# Patient Record
Sex: MALE | Race: OTHER | HISPANIC OR LATINO | URBAN - METROPOLITAN AREA
[De-identification: names, ages, dates, MRNs, and addresses within clinical notes are randomized per-mention and may not be internally consistent; named-entity substitution may affect disease eponyms.]

---

## 2022-05-22 ENCOUNTER — HOSPITAL ENCOUNTER (EMERGENCY)
Facility: HOSPITAL | Age: 22
Discharge: HOME | End: 2022-05-22
Attending: EMERGENCY MEDICINE

## 2022-05-22 VITALS
DIASTOLIC BLOOD PRESSURE: 88 MMHG | TEMPERATURE: 99 F | WEIGHT: 185 LBS | SYSTOLIC BLOOD PRESSURE: 138 MMHG | BODY MASS INDEX: 29.73 KG/M2 | HEIGHT: 66 IN | OXYGEN SATURATION: 100 % | HEART RATE: 89 BPM | RESPIRATION RATE: 20 BRPM

## 2022-05-22 DIAGNOSIS — S92.535A CLOSED NONDISPLACED FRACTURE OF DISTAL PHALANX OF LESSER TOE OF LEFT FOOT, INITIAL ENCOUNTER: Primary | ICD-10-CM

## 2022-05-22 DIAGNOSIS — T14.90XA BLUNT TRAUMA: ICD-10-CM

## 2022-05-22 DIAGNOSIS — S90.812A FOOT ABRASION, LEFT, INITIAL ENCOUNTER: ICD-10-CM

## 2022-05-22 PROCEDURE — 25000003 PHARM REV CODE 250: Performed by: NURSE PRACTITIONER

## 2022-05-22 PROCEDURE — 99284 EMERGENCY DEPT VISIT MOD MDM: CPT | Mod: 25

## 2022-05-22 RX ORDER — IBUPROFEN 400 MG/1
800 TABLET ORAL
Status: COMPLETED | OUTPATIENT
Start: 2022-05-22 | End: 2022-05-22

## 2022-05-22 RX ORDER — IBUPROFEN 800 MG/1
800 TABLET ORAL EVERY 6 HOURS PRN
Qty: 20 TABLET | Refills: 0 | Status: SHIPPED | OUTPATIENT
Start: 2022-05-22

## 2022-05-22 RX ORDER — TRAMADOL HYDROCHLORIDE 50 MG/1
50 TABLET ORAL
Status: COMPLETED | OUTPATIENT
Start: 2022-05-22 | End: 2022-05-22

## 2022-05-22 RX ORDER — TRAMADOL HYDROCHLORIDE 50 MG/1
50 TABLET ORAL EVERY 6 HOURS PRN
Qty: 12 TABLET | Refills: 0 | Status: SHIPPED | OUTPATIENT
Start: 2022-05-22 | End: 2022-05-25

## 2022-05-22 RX ORDER — CEPHALEXIN 500 MG/1
500 CAPSULE ORAL EVERY 8 HOURS
Qty: 21 CAPSULE | Refills: 0 | Status: SHIPPED | OUTPATIENT
Start: 2022-05-22 | End: 2022-05-29

## 2022-05-22 RX ADMIN — TRAMADOL HYDROCHLORIDE 50 MG: 50 TABLET, COATED ORAL at 09:05

## 2022-05-22 RX ADMIN — IBUPROFEN 800 MG: 400 TABLET, FILM COATED ORAL at 09:05

## 2024-11-11 NOTE — ED PROVIDER NOTES
Encounter Date: 5/22/2022    SCRIBE #1 NOTE: Meron REECE am scribing for, and in the presence of, Ale Jordan NP.       History     Chief Complaint   Patient presents with    Foot Pain     Left foot pain and swelling after dropping a 45 lbs weight on foot+ cap refill +1 Dp pulse noted, pinky toe smashed at nail bed, with mild bleeding noted, which is controled at triage      Dean Mckinnon is a 22 y.o. male who  has no past medical history on file.    The patient presents to the ED for evaluation of left foot injury.  Patient reports he dropped a 45-lb weight onto his left foot approximately 1 hour ago. He complains of pain and swelling to dorsum of his left foot near his toes, rated 8/10 in severity, as well as mild bleeding from left fifth toe nail bed. He is also complaining of numbness to his toes. Pain is exacerbated with movement, and patient notes he is unable to bear weight on foot. He denies any tingling sensation. He denies having any other medical problems at this time.       The history is provided by the patient. The history is limited by a language barrier. A  was used (ID: [id number]).     Review of patient's allergies indicates:  No Known Allergies  History reviewed. No pertinent past medical history.  History reviewed. No pertinent surgical history.  History reviewed. No pertinent family history.     Review of Systems   Constitutional: Negative for fever.   HENT: Negative for sore throat.    Respiratory: Negative for shortness of breath.    Cardiovascular: Negative for chest pain.   Gastrointestinal: Negative for nausea.   Genitourinary: Negative for dysuria.   Musculoskeletal: Positive for joint swelling (left foot). Negative for back pain.   Skin: Positive for wound. Negative for rash.   Neurological: Positive for numbness. Negative for weakness.   Hematological: Does not bruise/bleed easily.   All other systems reviewed and are negative.    Physical Exam  Urine cytology. Order placed.      Cystoscopy to to further evaluate microscopic hematuria that persists and tobacco use risk factor.     Kidney stone prevention discussed. Handouts   -Limit animal protein to 8 ounces  -Water intake 60 to 80 ounces a day  -Limit salt intake.   -Add aliya to water  -Low oxalate diet.        Initial Vitals [05/22/22 0900]   BP Pulse Resp Temp SpO2   (!) 145/75 109 20 98.5 °F (36.9 °C) 99 %      MAP       --         Physical Exam    Nursing note and vitals reviewed.  Constitutional: He appears well-developed and well-nourished.   HENT:   Head: Normocephalic and atraumatic.   Eyes: Conjunctivae and EOM are normal. Pupils are equal, round, and reactive to light.   Neck:   Normal range of motion.  Cardiovascular: Normal rate, regular rhythm, normal heart sounds and intact distal pulses. Exam reveals no gallop and no friction rub.    No murmur heard.  Pulmonary/Chest: Breath sounds normal. No respiratory distress. He has no wheezes. He has no rhonchi. He has no rales. He exhibits no tenderness.   Musculoskeletal:         General: Tenderness and edema present.      Cervical back: Normal range of motion.      Right foot: Normal.      Left foot: Decreased range of motion. Normal capillary refill. Swelling, laceration, tenderness and bony tenderness present. Normal pulse.     Neurological: He is alert and oriented to person, place, and time. He has normal strength. GCS score is 15. GCS eye subscore is 4. GCS verbal subscore is 5. GCS motor subscore is 6.   Skin: Capillary refill takes less than 2 seconds.       ED Course   Procedures  Labs Reviewed - No data to display       Imaging Results           X-Ray Foot Complete Left (Final result)  Result time 05/22/22 10:24:03    Final result by Nikita Mays MD (05/22/22 10:24:03)                 Impression:      1. 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  2. Small avulsion fracture versus soft tissue calcification at the lateral aspect of the 4th proximal phalanx head.  This report was flagged in Epic as abnormal.      Electronically signed by: Nikita Mays MD  Date:    05/22/2022  Time:    10:24             Narrative:    EXAMINATION:  XR FOOT COMPLETE 3 VIEW LEFT    CLINICAL HISTORY:  .  Injury,  unspecified, initial encounter    TECHNIQUE:  AP, lateral and oblique views of the left foot were performed.    COMPARISON:  None.    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  Linear high attenuation focus at the lateral aspect of the 4th proximal phalanx head, possibly a soft tissue calcification or small avulsion fracture.  Cartilage spaces are preserved.  No subluxation or dislocation.  Tarsometatarsal alignment is congruent.  There is soft tissue swelling about the 4th and 5th digits.  No radiopaque foreign bodies.                                 Medications   traMADoL tablet 50 mg (50 mg Oral Given 5/22/22 0956)   ibuprofen tablet 800 mg (800 mg Oral Given 5/22/22 0956)     Medical Decision Making:   Initial Assessment:   23 y/o male which presents with pain to his left foot after a 45 lb weight was dropped on his foot PTA. Xray pending.   Differential Diagnosis:   Foot fracture, toe fracture, foot contusion, hematoma  Clinical Tests:   Radiological Study: Ordered and Reviewed  ED Management:  Pt examined and noted to have an abrasion to his left 5th toe along with edema and pain to all the lesser toes. Xray shows fracture of the 5th toe and other lesser toes. Pt was given a post op shoe and advised to follow up with podiatry for further evaluation. The referral for podiatry has been placed. He was discharged with tramadol, cephalexin and ibuprofen. Patient given strict return precautions and voiced understanding of all discharge instructions. Pt was stable at discharge.               Scribe Attestation:   Scribe #1: I performed the above scribed service and the documentation accurately describes the services I performed. I attest to the accuracy of the note.        ED Course as of 05/22/22 1449   Sun May 22, 2022   0903 BP(!): 145/75 [AT]   0903 Temp: 98.5 °F (36.9 °C) [AT]   0903 Temp src: Oral  [AT]   0903 Pulse: 109 [AT]   0903 Resp: 20 [AT]   0903 SpO2: 99 % [AT]      ED Course User Index  [AT] OSVALDO Quinones             Clinical Impression:   Final diagnoses:  [T14.90XA] Blunt trauma  [S92.535A] Closed nondisplaced fracture of distal phalanx of lesser toe of left foot, initial encounter (Primary)  [S90.812A] Foot abrasion, left, initial encounter          ED Disposition Condition    Discharge Stable        ED Prescriptions     Medication Sig Dispense Start Date End Date Auth. Provider    cephALEXin (KEFLEX) 500 MG capsule Take 1 capsule (500 mg total) by mouth every 8 (eight) hours. for 7 days 21 capsule 5/22/2022 5/29/2022 OSVALDO Quinones    traMADoL (ULTRAM) 50 mg tablet Take 1 tablet (50 mg total) by mouth every 6 (six) hours as needed for Pain. 12 tablet 5/22/2022 5/25/2022 OSVALDO Quinones    ibuprofen (ADVIL,MOTRIN) 800 MG tablet Take 1 tablet (800 mg total) by mouth every 6 (six) hours as needed for Pain. 20 tablet 5/22/2022  OSVALDO Quinones        Follow-up Information     Follow up With Specialties Details Why Contact Info    Jade Luna DPM Podiatry Schedule an appointment as soon as possible for a visit in 1 week  [facility] [address]  [place] [address]  [phone]           This document was produced by a scribe under my direction and in my presence. I agree with the content of the note and have made any necessary edits.     Ale Jordan DNP, OSVALDO-BCScribe Attestation       OSVALDO Quinones  05/22/22 2717